# Patient Record
Sex: MALE | Race: WHITE | URBAN - METROPOLITAN AREA
[De-identification: names, ages, dates, MRNs, and addresses within clinical notes are randomized per-mention and may not be internally consistent; named-entity substitution may affect disease eponyms.]

---

## 2017-10-02 ENCOUNTER — APPOINTMENT (OUTPATIENT)
Dept: LAB | Facility: HOSPITAL | Age: 37
End: 2017-10-02
Attending: UROLOGY
Payer: COMMERCIAL

## 2017-10-02 PROCEDURE — 88304 TISSUE EXAM BY PATHOLOGIST: CPT | Performed by: UROLOGY

## 2021-04-19 ENCOUNTER — IMMUNIZATION (OUTPATIENT)
Dept: LAB | Age: 41
End: 2021-04-19
Attending: HOSPITALIST
Payer: COMMERCIAL

## 2021-04-19 DIAGNOSIS — Z23 NEED FOR VACCINATION: Primary | ICD-10-CM

## 2021-04-19 PROCEDURE — 0001A SARSCOV2 VAC 30MCG/0.3ML IM: CPT

## 2021-05-12 ENCOUNTER — IMMUNIZATION (OUTPATIENT)
Dept: LAB | Age: 41
End: 2021-05-12
Attending: HOSPITALIST
Payer: COMMERCIAL

## 2021-05-12 DIAGNOSIS — Z23 NEED FOR VACCINATION: Primary | ICD-10-CM

## 2021-05-12 PROCEDURE — 0002A SARSCOV2 VAC 30MCG/0.3ML IM: CPT

## 2021-11-22 ENCOUNTER — APPOINTMENT (OUTPATIENT)
Dept: URGENT CARE | Age: 41
End: 2021-11-22

## 2021-12-14 ENCOUNTER — IMMUNIZATION (OUTPATIENT)
Dept: LAB | Facility: HOSPITAL | Age: 41
End: 2021-12-14
Attending: EMERGENCY MEDICINE
Payer: COMMERCIAL

## 2021-12-14 DIAGNOSIS — Z23 NEED FOR VACCINATION: Primary | ICD-10-CM

## 2021-12-14 PROCEDURE — 0004A SARSCOV2 VAC 30MCG/0.3ML IM: CPT

## 2021-12-22 ENCOUNTER — TELEMEDICINE (OUTPATIENT)
Dept: TELEHEALTH | Age: 41
End: 2021-12-22

## 2021-12-22 DIAGNOSIS — Z20.822 EXPOSURE TO COVID-19 VIRUS: Primary | ICD-10-CM

## 2021-12-22 PROCEDURE — 99202 OFFICE O/P NEW SF 15 MIN: CPT | Performed by: NURSE PRACTITIONER

## 2021-12-22 NOTE — PATIENT INSTRUCTIONS
Coronavirus Disease 2019 (COVID-19)     [facility] [place] [address] is committed to the safety and well-being of our patients, members, employees, and communities.  As concerns arise about the new strain of coronavirus that causes COVID-19, [place] [address] exposure  • After day 7 from date of last exposure with a negative test result (test must occur on day 5 or later)  After stopping quarantine, you should  • Watch for symptoms until 14 days after exposure.   • If you have symptoms, immediately self-isolate Care     If you are awaiting test results or are confirmed positive for COVID -19, and your symptoms worsen at home with symptoms such as: extreme weakness, difficult breathing, or unrelenting fevers greater than 100.4 degrees Fahrenheit, you should contac Follow-up  If you are diagnosed with COVID, refrain from exercise until approved by your primary care provider. Please call your primary care provider within 2 days of your discharge to arrange for a telehealth follow-up.  CDC does not recommend repeat test Control & Prevention (CDC)  10 things you can do to manage your health at home, Juan.[email or web address]. pdf  [email or web address] Retrieved March 17, 2021, from [email or web address]/[email or web address]/[email or web address]-information/covid-19-long-haulers. html  Long-term effects of covid-19. (n.d.).  Retrieved May 11, 2021, from [email or web address]

## 2021-12-22 NOTE — PROGRESS NOTES
Virtual/Telephone Check-In    Jacobcristian Maddox verbally consents to a Virtual/Telephone Check-In service on 12/22/21. Patient has been referred to the [facility] website at [email or web address] to review the yearly Consent to Treat document.   Patient u visit. CHIEF COMPLAINT:   Patient presents with:  Covid-19 Test      HPI:   Devon Lira is a 35year old male who presents for a video visit. Patient reports exposure to COVID 3 days ago. Patient denies any symptoms at this time.  No congest ordered in this encounter       Risks, benefits, and side effects of medication explained and discussed.     Patient Instructions     Coronavirus Disease 2019 (COVID-19)     [facility] is committed to the safety and well-being of our patients, m consider include stopping quarantine  • After 14 days from date of last exposure  • After 10 days without testing from date of last exposure  • After day 7 from date of last exposure with a negative test result (test must occur on day 5 or later)  After st counters, tabletops, and doorknobs. Use household cleaning sprays or wipes according to the label instructions.          Seek Further Care     If you are awaiting test results or are confirmed positive for COVID -19, and your symptoms worsen at home with sy received a call within 2 business days, please call your primary care provider or check [employer] for results. Post-Discharge Follow-up  If you are diagnosed with COVID, refrain from exercise until approved by your primary care provider.  Please call your coronavirus disease 2019, [email or web address]. pdf  Centers for Disease Control & Prevention (CDC)  10 things you can do to manage your health at home, [employer]uk away from other people    References:  Long haulers: Why some people experience long-term coronavirus symptoms. (2021, February 08). Retrieved March 17, 2021, from [email or web address]-information/[email or web address]-19-long-gerardo. html  Long

## 2021-12-23 ENCOUNTER — NURSE ONLY (OUTPATIENT)
Dept: LAB | Facility: HOSPITAL | Age: 41
End: 2021-12-23
Attending: NURSE PRACTITIONER
Payer: COMMERCIAL

## 2021-12-23 DIAGNOSIS — Z20.822 EXPOSURE TO COVID-19 VIRUS: ICD-10-CM

## 2022-02-14 ENCOUNTER — APPOINTMENT (OUTPATIENT)
Dept: GENERAL RADIOLOGY | Facility: HOSPITAL | Age: 42
End: 2022-02-14
Attending: EMERGENCY MEDICINE
Payer: COMMERCIAL

## 2022-02-14 ENCOUNTER — HOSPITAL ENCOUNTER (EMERGENCY)
Facility: HOSPITAL | Age: 42
Discharge: HOME | End: 2022-02-14
Attending: EMERGENCY MEDICINE
Payer: COMMERCIAL

## 2022-02-14 ENCOUNTER — APPOINTMENT (OUTPATIENT)
Dept: CT IMAGING | Facility: HOSPITAL | Age: 42
End: 2022-02-14
Attending: EMERGENCY MEDICINE
Payer: COMMERCIAL

## 2022-02-14 VITALS
BODY MASS INDEX: 23.49 KG/M2 | HEIGHT: 74 IN | DIASTOLIC BLOOD PRESSURE: 77 MMHG | TEMPERATURE: 98 F | WEIGHT: 183 LBS | RESPIRATION RATE: 12 BRPM | HEART RATE: 61 BPM | SYSTOLIC BLOOD PRESSURE: 135 MMHG | OXYGEN SATURATION: 98 %

## 2022-02-14 DIAGNOSIS — S40.021A CONTUSION OF RIGHT UPPER EXTREMITY, INITIAL ENCOUNTER: ICD-10-CM

## 2022-02-14 DIAGNOSIS — R55 SYNCOPE, VASOVAGAL: ICD-10-CM

## 2022-02-14 DIAGNOSIS — S09.8XXA BLUNT HEAD TRAUMA, INITIAL ENCOUNTER: Primary | ICD-10-CM

## 2022-02-14 DIAGNOSIS — S00.81XA ABRASION OF FACE, INITIAL ENCOUNTER: ICD-10-CM

## 2022-02-14 LAB
ALBUMIN SERPL-MCNC: 3.8 G/DL (ref 3.4–5)
ALBUMIN/GLOB SERPL: 1.1 {RATIO} (ref 1–2)
ALP LIVER SERPL-CCNC: 60 U/L
ALT SERPL-CCNC: 25 U/L
ANION GAP SERPL CALC-SCNC: 4 MMOL/L (ref 0–18)
APTT PPP: 28.5 SECONDS (ref 23.3–35.6)
AST SERPL-CCNC: 32 U/L (ref 15–37)
ATRIAL RATE: 62 BPM
BASOPHILS # BLD AUTO: 0.06 X10(3) UL (ref 0–0.2)
BASOPHILS NFR BLD AUTO: 0.5 %
BILIRUB SERPL-MCNC: 0.4 MG/DL (ref 0.1–2)
BUN BLD-MCNC: 13 MG/DL (ref 7–18)
CALCIUM BLD-MCNC: 8.9 MG/DL (ref 8.5–10.1)
CHLORIDE SERPL-SCNC: 99 MMOL/L (ref 98–112)
CO2 SERPL-SCNC: 31 MMOL/L (ref 21–32)
CREAT BLD-MCNC: 0.91 MG/DL
EOSINOPHIL # BLD AUTO: 0.04 X10(3) UL (ref 0–0.7)
EOSINOPHIL NFR BLD AUTO: 0.3 %
ERYTHROCYTE [DISTWIDTH] IN BLOOD BY AUTOMATED COUNT: 12.8 %
ETHANOL SERPL-MCNC: <3 MG/DL (ref ?–3)
GLOBULIN PLAS-MCNC: 3.4 G/DL (ref 2.8–4.4)
GLUCOSE BLD-MCNC: 114 MG/DL (ref 70–99)
GLUCOSE BLD-MCNC: 133 MG/DL (ref 70–99)
HCT VFR BLD AUTO: 43.7 %
HGB BLD-MCNC: 15 G/DL
IMM GRANULOCYTES # BLD AUTO: 0.07 X10(3) UL (ref 0–1)
IMM GRANULOCYTES NFR BLD: 0.6 %
INR BLD: 1.07 (ref 0.8–1.2)
LYMPHOCYTES # BLD AUTO: 1.2 X10(3) UL (ref 1–4)
MCH RBC QN AUTO: 30.3 PG (ref 26–34)
MCHC RBC AUTO-ENTMCNC: 34.3 G/DL (ref 31–37)
MCV RBC AUTO: 88.3 FL
MONOCYTES # BLD AUTO: 0.83 X10(3) UL (ref 0.1–1)
MONOCYTES NFR BLD AUTO: 6.7 %
NEUTROPHILS # BLD AUTO: 10.24 X10 (3) UL (ref 1.5–7.7)
NEUTROPHILS # BLD AUTO: 10.24 X10(3) UL (ref 1.5–7.7)
NEUTROPHILS NFR BLD AUTO: 82.3 %
OSMOLALITY SERPL CALC.SUM OF ELEC: 280 MOSM/KG (ref 275–295)
P AXIS: 61 DEGREES
P-R INTERVAL: 176 MS
PLATELET # BLD AUTO: 189 10(3)UL (ref 150–450)
POTASSIUM SERPL-SCNC: 4.3 MMOL/L (ref 3.5–5.1)
PROT SERPL-MCNC: 7.2 G/DL (ref 6.4–8.2)
PROTHROMBIN TIME: 14 SECONDS (ref 11.6–14.8)
Q-T INTERVAL: 452 MS
QRS DURATION: 94 MS
QTC CALCULATION (BEZET): 458 MS
R AXIS: 88 DEGREES
RBC # BLD AUTO: 4.95 X10(6)UL
SARS-COV-2 RNA RESP QL NAA+PROBE: NOT DETECTED
SODIUM SERPL-SCNC: 134 MMOL/L (ref 136–145)
T AXIS: 39 DEGREES
TROPONIN I HIGH SENSITIVITY: 5 NG/L
VENTRICULAR RATE: 62 BPM
WBC # BLD AUTO: 12.4 X10(3) UL (ref 4–11)

## 2022-02-14 PROCEDURE — 82962 GLUCOSE BLOOD TEST: CPT

## 2022-02-14 PROCEDURE — 96361 HYDRATE IV INFUSION ADD-ON: CPT

## 2022-02-14 PROCEDURE — 96374 THER/PROPH/DIAG INJ IV PUSH: CPT

## 2022-02-14 PROCEDURE — 73080 X-RAY EXAM OF ELBOW: CPT | Performed by: EMERGENCY MEDICINE

## 2022-02-14 PROCEDURE — 85730 THROMBOPLASTIN TIME PARTIAL: CPT | Performed by: EMERGENCY MEDICINE

## 2022-02-14 PROCEDURE — 71045 X-RAY EXAM CHEST 1 VIEW: CPT | Performed by: EMERGENCY MEDICINE

## 2022-02-14 PROCEDURE — 72125 CT NECK SPINE W/O DYE: CPT | Performed by: EMERGENCY MEDICINE

## 2022-02-14 PROCEDURE — 80053 COMPREHEN METABOLIC PANEL: CPT | Performed by: EMERGENCY MEDICINE

## 2022-02-14 PROCEDURE — 99284 EMERGENCY DEPT VISIT MOD MDM: CPT

## 2022-02-14 PROCEDURE — 93010 ELECTROCARDIOGRAM REPORT: CPT

## 2022-02-14 PROCEDURE — 73110 X-RAY EXAM OF WRIST: CPT | Performed by: EMERGENCY MEDICINE

## 2022-02-14 PROCEDURE — 99285 EMERGENCY DEPT VISIT HI MDM: CPT

## 2022-02-14 PROCEDURE — 85610 PROTHROMBIN TIME: CPT | Performed by: EMERGENCY MEDICINE

## 2022-02-14 PROCEDURE — 93005 ELECTROCARDIOGRAM TRACING: CPT

## 2022-02-14 PROCEDURE — 90471 IMMUNIZATION ADMIN: CPT

## 2022-02-14 PROCEDURE — 84484 ASSAY OF TROPONIN QUANT: CPT | Performed by: EMERGENCY MEDICINE

## 2022-02-14 PROCEDURE — 85025 COMPLETE CBC W/AUTO DIFF WBC: CPT | Performed by: EMERGENCY MEDICINE

## 2022-02-14 PROCEDURE — 82077 ASSAY SPEC XCP UR&BREATH IA: CPT | Performed by: EMERGENCY MEDICINE

## 2022-02-14 PROCEDURE — 70486 CT MAXILLOFACIAL W/O DYE: CPT | Performed by: EMERGENCY MEDICINE

## 2022-02-14 PROCEDURE — 70450 CT HEAD/BRAIN W/O DYE: CPT | Performed by: EMERGENCY MEDICINE

## 2022-02-14 RX ORDER — KETOROLAC TROMETHAMINE 30 MG/ML
30 INJECTION, SOLUTION INTRAMUSCULAR; INTRAVENOUS ONCE
Status: COMPLETED | OUTPATIENT
Start: 2022-02-14 | End: 2022-02-14

## 2022-02-14 RX ORDER — SODIUM CHLORIDE 9 MG/ML
1000 INJECTION, SOLUTION INTRAVENOUS ONCE
Status: COMPLETED | OUTPATIENT
Start: 2022-02-14 | End: 2022-02-14

## 2022-02-14 RX ORDER — CYCLOBENZAPRINE HCL 10 MG
10 TABLET ORAL 3 TIMES DAILY PRN
Qty: 10 TABLET | Refills: 0 | Status: SHIPPED | OUTPATIENT
Start: 2022-02-14

## 2022-02-14 NOTE — ED INITIAL ASSESSMENT (HPI)
PT PRESENTS TO ED AFTER HE STATES HE INJURED RIGHT ARM STATES HE HIT IT ON DOOR, STATES HE THEN HAD SYNCOPAL EPISODE DUE TO PAIN, FELL AND HIT HEAD, STATES HE LOST CONSCIOUSNESS. AMBULATORY UPON ARRIVAL, PT STATES HE NOW HAS NECK PAIN.

## (undated) NOTE — LETTER
Date & Time: 2/14/2022, 5:37 PM  Patient: Shama Doctor  Encounter Provider(s):    Kory Peñaloza MD       To Whom It May Concern:    Shama Doctor was seen and treated in our department on 2/14/2022. He should not return to work until FEBRUARY 17, 2022.     If you have any questions or concerns, please do not hesitate to call.        _____________________________  Physician/APC Signature